# Patient Record
Sex: MALE | Race: BLACK OR AFRICAN AMERICAN | Employment: UNEMPLOYED | URBAN - METROPOLITAN AREA
[De-identification: names, ages, dates, MRNs, and addresses within clinical notes are randomized per-mention and may not be internally consistent; named-entity substitution may affect disease eponyms.]

---

## 2018-05-06 ENCOUNTER — HOSPITAL ENCOUNTER (EMERGENCY)
Age: 43
Discharge: HOME | End: 2018-05-06
Attending: EMERGENCY MEDICINE
Payer: SELF-PAY

## 2018-05-06 VITALS
OXYGEN SATURATION: 98 % | HEIGHT: 67 IN | BODY MASS INDEX: 28.25 KG/M2 | SYSTOLIC BLOOD PRESSURE: 147 MMHG | RESPIRATION RATE: 16 BRPM | DIASTOLIC BLOOD PRESSURE: 98 MMHG | WEIGHT: 180 LBS | HEART RATE: 94 BPM | TEMPERATURE: 98.5 F

## 2018-05-06 DIAGNOSIS — F41.8 ANXIETY ASSOCIATED WITH DEPRESSION: ICD-10-CM

## 2018-05-06 DIAGNOSIS — Z00.00 GENERAL MEDICAL EXAM: Primary | ICD-10-CM

## 2018-05-06 PROCEDURE — 99282 EMERGENCY DEPT VISIT SF MDM: CPT

## 2018-05-06 RX ORDER — VENLAFAXINE HYDROCHLORIDE 150 MG/1
300 CAPSULE, EXTENDED RELEASE ORAL DAILY
COMMUNITY
End: 2018-05-06

## 2018-05-06 RX ORDER — VENLAFAXINE HYDROCHLORIDE 150 MG/1
300 CAPSULE, EXTENDED RELEASE ORAL DAILY
Qty: 60 CAP | Refills: 0 | Status: SHIPPED | OUTPATIENT
Start: 2018-05-06 | End: 2018-06-05

## 2018-05-06 RX ORDER — LORAZEPAM 0.5 MG/1
0.5-1 TABLET ORAL
Qty: 5 TAB | Refills: 0 | Status: SHIPPED | OUTPATIENT
Start: 2018-05-06

## 2018-05-06 NOTE — ED TRIAGE NOTES
\"I was incarcerated. When they released me, they only gave me four tablets of my Effexor. I need a refill. I've been out of it for two days. \"

## 2018-05-06 NOTE — DISCHARGE INSTRUCTIONS

## 2018-05-06 NOTE — ED PROVIDER NOTES
EMERGENCY DEPARTMENT HISTORY AND PHYSICAL EXAM    1:17 PM      Date: 5/6/2018  Patient Name: Kieran Ashraf    History of Presenting Illness     Chief Complaint   Patient presents with    Medication Refill         History Provided By: Patient    Additional History (Context): 1:17 PM Kieran Ashraf is a 41 y.o. male with no pertinent MHx who presents to ED for acute medication refill onset 2 days ago. Patient states that he has not had his medication for 2 days. He was incarcerated for 5 yrs and has been having a hard time getting his mediation. He was told by the medicaid office to report to the ED if necessary. He takes 125mg of Effexor and Ativan. No other concerns or symptoms at this time. As the patient is without physical symptoms or complaints of pain, there is no severity of pain, quality of pain, duration, modifying factors, or associated signs and symptoms regarding the pt's presenting complaint. PCP: None    Chief Complaint: Medication Refill  Duration: 2 Days  Timing:  Acute  Location: N/A  Quality: N/A  Severity: N/A  Modifying Factors: N/A  Associated Symptoms: denies any other associated signs or symptoms      Current Outpatient Prescriptions   Medication Sig Dispense Refill    venlafaxine-SR (EFFEXOR XR) 150 mg capsule Take 300 mg by mouth daily. Past History     Past Medical History:  No past medical history on file. Past Surgical History:  No past surgical history on file. Family History:  No family history on file. Social History:  Social History   Substance Use Topics    Smoking status: Not on file    Smokeless tobacco: Not on file    Alcohol use Not on file       Allergies:  No Known Allergies      Review of Systems     Review of Systems   Constitutional: Negative for chills and fever. Respiratory: Negative for shortness of breath. Cardiovascular: Negative for chest pain. Gastrointestinal: Negative for abdominal pain, diarrhea, nausea and vomiting. Psychiatric/Behavioral: The patient is not nervous/anxious. All other systems reviewed and are negative. Physical Exam     Visit Vitals    BP (!) 147/98 (BP 1 Location: Left arm, BP Patient Position: At rest)    Pulse 94    Temp 98.5 °F (36.9 °C)    Resp 16    Ht 5' 7\" (1.702 m)    Wt 81.6 kg (180 lb)    SpO2 98%    BMI 28.19 kg/m2       Physical Exam   Constitutional: He is oriented to person, place, and time. He appears well-developed. HENT:   Head: Normocephalic and atraumatic. Eyes: EOM are normal. Pupils are equal, round, and reactive to light. Neck: Normal range of motion. Neck supple. Cardiovascular: Normal rate, regular rhythm and normal heart sounds. Exam reveals no friction rub. No murmur heard. Pulmonary/Chest: Effort normal and breath sounds normal. No respiratory distress. He has no wheezes. Abdominal: Soft. He exhibits no distension. There is no tenderness. There is no rebound and no guarding. Musculoskeletal: Normal range of motion. Neurological: He is alert and oriented to person, place, and time. Skin: Skin is warm and dry. Psychiatric: He has a normal mood and affect. His behavior is normal. Thought content normal.         Diagnostic Study Results         Medical Decision Making     1. Med refill; will rx a effoxor and a few ativan. No other complaints. Diagnosis     No diagnosis found. _______________________________    Attestations:  Jeromy Murray acting as a scribe for and in the presence of Jj Houston MD      May 06, 2018 at 1:17 PM       Provider Attestation:      I personally performed the services described in the documentation, reviewed the documentation, as recorded by the scribe in my presence, and it accurately and completely records my words and actions.  May 06, 2018 at 1:17 PM - Jj Houston MD    _______________________________

## 2018-07-15 ENCOUNTER — HOSPITAL ENCOUNTER (EMERGENCY)
Age: 43
Discharge: OTHER INSTITUTION | End: 2018-07-16
Attending: EMERGENCY MEDICINE
Payer: SELF-PAY

## 2018-07-15 DIAGNOSIS — F14.90 COCAINE USE: ICD-10-CM

## 2018-07-15 DIAGNOSIS — R45.851 SUICIDAL IDEATIONS: Primary | ICD-10-CM

## 2018-07-15 DIAGNOSIS — I10 ELEVATED BLOOD PRESSURE READING IN OFFICE WITH DIAGNOSIS OF HYPERTENSION: ICD-10-CM

## 2018-07-15 LAB
AMPHET UR QL SCN: NEGATIVE
ANION GAP SERPL CALC-SCNC: 7 MMOL/L (ref 3–18)
BARBITURATES UR QL SCN: NEGATIVE
BASOPHILS # BLD: 0 K/UL (ref 0–0.1)
BASOPHILS NFR BLD: 1 % (ref 0–2)
BENZODIAZ UR QL: NEGATIVE
BUN SERPL-MCNC: 12 MG/DL (ref 7–18)
BUN/CREAT SERPL: 9 (ref 12–20)
CALCIUM SERPL-MCNC: 9 MG/DL (ref 8.5–10.1)
CANNABINOIDS UR QL SCN: NEGATIVE
CHLORIDE SERPL-SCNC: 103 MMOL/L (ref 100–108)
CO2 SERPL-SCNC: 31 MMOL/L (ref 21–32)
COCAINE UR QL SCN: POSITIVE
CREAT SERPL-MCNC: 1.28 MG/DL (ref 0.6–1.3)
DIFFERENTIAL METHOD BLD: ABNORMAL
EOSINOPHIL # BLD: 0.2 K/UL (ref 0–0.4)
EOSINOPHIL NFR BLD: 5 % (ref 0–5)
ERYTHROCYTE [DISTWIDTH] IN BLOOD BY AUTOMATED COUNT: 12.6 % (ref 11.6–14.5)
ETHANOL SERPL-MCNC: <3 MG/DL (ref 0–3)
GLUCOSE SERPL-MCNC: 132 MG/DL (ref 74–99)
HCT VFR BLD AUTO: 40.5 % (ref 36–48)
HDSCOM,HDSCOM: ABNORMAL
HGB BLD-MCNC: 13.8 G/DL (ref 13–16)
LYMPHOCYTES # BLD: 1.8 K/UL (ref 0.9–3.6)
LYMPHOCYTES NFR BLD: 34 % (ref 21–52)
MCH RBC QN AUTO: 28.9 PG (ref 24–34)
MCHC RBC AUTO-ENTMCNC: 34.1 G/DL (ref 31–37)
MCV RBC AUTO: 84.7 FL (ref 74–97)
METHADONE UR QL: NEGATIVE
MONOCYTES # BLD: 0.6 K/UL (ref 0.05–1.2)
MONOCYTES NFR BLD: 12 % (ref 3–10)
NEUTS SEG # BLD: 2.6 K/UL (ref 1.8–8)
NEUTS SEG NFR BLD: 48 % (ref 40–73)
OPIATES UR QL: POSITIVE
PCP UR QL: NEGATIVE
PLATELET # BLD AUTO: 296 K/UL (ref 135–420)
PMV BLD AUTO: 9.4 FL (ref 9.2–11.8)
POTASSIUM SERPL-SCNC: 3.7 MMOL/L (ref 3.5–5.5)
RBC # BLD AUTO: 4.78 M/UL (ref 4.7–5.5)
SODIUM SERPL-SCNC: 141 MMOL/L (ref 136–145)
WBC # BLD AUTO: 5.3 K/UL (ref 4.6–13.2)

## 2018-07-15 PROCEDURE — 99284 EMERGENCY DEPT VISIT MOD MDM: CPT

## 2018-07-15 PROCEDURE — 80307 DRUG TEST PRSMV CHEM ANLYZR: CPT | Performed by: EMERGENCY MEDICINE

## 2018-07-15 PROCEDURE — 80048 BASIC METABOLIC PNL TOTAL CA: CPT | Performed by: EMERGENCY MEDICINE

## 2018-07-15 PROCEDURE — 85025 COMPLETE CBC W/AUTO DIFF WBC: CPT | Performed by: EMERGENCY MEDICINE

## 2018-07-15 NOTE — BSMART NOTE
Per Manjula Perez clinician, client is accepted for admission to [facility] by Kari Coombs to arrive at 1000 tomorrow morning, 7/16/18. ED staff notified, Kristan Conklin is arranging transportation.

## 2018-07-15 NOTE — ED NOTES
Ainsley Orozco from crisis talked to patient. She is going to get [facility] ([facility]) involved to walk him through the intake process.

## 2018-07-15 NOTE — ED TRIAGE NOTES
Pt states, \"i haven't been doing well. I haven't been home; i've been running the streets since Friday doing drugs (cocaine) and I need help. I don't know what to do. \"

## 2018-07-15 NOTE — BSMART NOTE
Spoke with Matthew Wayne clinician & Lisa Diehl, [facility] Support and they will both come in to see client.

## 2018-07-15 NOTE — BSMART NOTE
Per Ashish De Paz - SRINIVAS clinician:    [facility] - No bed today, can review tomorrow  [facility] sent, will review for tomorrow   [facility] - Will review request today

## 2018-07-15 NOTE — ED PROVIDER NOTES
EMERGENCY DEPARTMENT HISTORY AND PHYSICAL EXAM    Date: 7/15/2018  Patient Name: Selina Rodrigez    History of Presenting Illness     Chief Complaint   Patient presents with    Mental Health Problem         History Provided By: Patient    Chief Complaint: Depression, insomnia, drug abuse   Duration: 5 days   Timing:  Worsening  Location: n/a  Quality: n/a  Severity: Moderate  Modifying Factors: None   Associated Symptoms: none       Additional History (Context): Selina Rodrigez is a 41 y.o. male with no significant medical history who presents with a 5 day history of depression, insomnia, and cocaine abuse. Reports he has not been sleeping for the past five days, has been running the streets, using cocaine, last use yesterday, reports he has not been on his antidepressants for the past 3 months due to loss of insurance and lack of money. Denies SI, HI or hallucinations. Has never been hospitalized for mental health. Pt denies any fevers or chills, headache, dizziness or light headedness, ENT issues, CP or discomfort, SOB, cough, n/v/d/c, abd pain, back pain, diaphoresis, melena/hematochezia, dysuria, hematuria, frequency, focal weakness/numbness/tingling, or rash. Patient has no other complaints at this time. PCP: None    Current Outpatient Prescriptions   Medication Sig Dispense Refill    LORazepam (ATIVAN) 0.5 mg tablet Take 1-2 Tabs by mouth every eight (8) hours as needed for Anxiety. Max Daily Amount: 3 mg. 5 Tab 0       Past History     Past Medical History:  No past medical history on file. Past Surgical History:  No past surgical history on file. Family History:  No family history on file. Social History:  Social History   Substance Use Topics    Smoking status: Not on file    Smokeless tobacco: Not on file    Alcohol use Not on file       Allergies:  No Known Allergies      Review of Systems   Review of Systems   Constitutional: Negative for chills and fever.    HENT: Negative for congestion, rhinorrhea and sore throat. Respiratory: Negative for cough and shortness of breath. Cardiovascular: Negative for chest pain. Gastrointestinal: Negative for abdominal pain, blood in stool, constipation, diarrhea, nausea and vomiting. Genitourinary: Negative for dysuria, frequency and hematuria. Musculoskeletal: Negative for back pain and myalgias. Skin: Negative for rash and wound. Neurological: Negative for dizziness and headaches. Psychiatric/Behavioral:        Depression and drug abuse    All other systems reviewed and are negative. All Other Systems Negative  Physical Exam     Vitals:    07/15/18 1520 07/15/18 2115 07/16/18 0600 07/16/18 0840   BP: 124/82 119/78 115/70 124/74   Pulse: 63 (!) 58 75 88   Resp:  18 18 16   Temp: 99.1 °F (37.3 °C) 97.4 °F (36.3 °C) 98 °F (36.7 °C) 98.4 °F (36.9 °C)   SpO2:  99% 100% 98%   Weight:       Height:         Physical Exam   Constitutional: He is oriented to person, place, and time. He appears well-developed and well-nourished. No distress. HENT:   Head: Normocephalic and atraumatic. Eyes: Conjunctivae are normal.   Neck: Normal range of motion. Neck supple. Cardiovascular: Normal rate, regular rhythm and normal heart sounds. Pulmonary/Chest: Effort normal and breath sounds normal. No respiratory distress. He exhibits no tenderness. Abdominal: Soft. Bowel sounds are normal. He exhibits no distension. There is no tenderness. There is no rebound and no guarding. Musculoskeletal: Normal range of motion. He exhibits no edema or deformity. Neurological: He is alert and oriented to person, place, and time. Skin: Skin is warm and dry. He is not diaphoretic. Psychiatric: His speech is normal and behavior is normal. Judgment and thought content normal. Cognition and memory are normal. He exhibits a depressed mood. He expresses no homicidal and no suicidal ideation. He expresses no suicidal plans and no homicidal plans.    Nursing note and vitals reviewed. Diagnostic Study Results     Labs -     No results found for this or any previous visit (from the past 12 hour(s)). Radiologic Studies -   No orders to display     CT Results  (Last 48 hours)    None        CXR Results  (Last 48 hours)    None            Medical Decision Making   I am the first provider for this patient. I reviewed the vital signs, available nursing notes, past medical history, past surgical history, family history and social history. Vital Signs-Reviewed the patient's vital signs. Pulse Oximetry Analysis -  100 % RA    Records Reviewed: Nursing Notes and Old Medical Records     Procedures: None   Procedures    Provider Notes (Medical Decision Making):     Differential Diagnosis: substance abuse, alcohol intoxication, substance withdrawal, acute psychotic episode, anxiety, panic disorder, bubba, undifferentiated schizophrenia, depression, SI, HI, medication non-compliance, other mood disorder    Plan: will medically clear and call crisis    7:41 AM  Have discussed case with Radha Clayton with Crisis who agrees to come to ED to evaluate patient    8:53 AM  Radha Clayton does not feel patient is appropriate for [facility] eval or admission is going to contact Peer group for further eval     12:12 PM  Peer group recommends Crisis stabilization, Anna Long will now look for placement       12:55 PM : Pt care transferred to Scott Roblero NP  ,ED provider. History of patient complaint(s), available diagnostic reports and current treatment plan has been discussed thoroughly. Bedside rounding on patient occured : no . Intended disposition of patient : Placement for crisis stabilization   Pending diagnostics reports and/or labs (please list): Pending placement and acceptance to facility  1:48 PM - Mr. Stephen Kaye is resting comfortably and wakes easily, has finished 50% of his lunch and beverages and says that he is tired and appreciates the bed and quiet.   He is currently pending further disposition by Sirena QUEEN and documentation is on his paper chart. No current suicidal or homicidal ideations. Mundo Álvarez DNP, FNP-C        MED RECONCILIATION:  No current facility-administered medications for this encounter. Current Outpatient Prescriptions   Medication Sig    LORazepam (ATIVAN) 0.5 mg tablet Take 1-2 Tabs by mouth every eight (8) hours as needed for Anxiety. Max Daily Amount: 3 mg. Disposition:  Transfer     Diagnosis     Clinical Impression:   1. Suicidal ideations    2. Elevated blood pressure reading in office with diagnosis of hypertension    3.  Cocaine use

## 2018-07-15 NOTE — BSMART NOTE
40 yo M seen in ED room 21 at the request of NORMA Spivey. Drowsy, wakes easily to name call to alert & O x 4, polite and cooperative. Appears & reports depression. Memory and judgement intact, insight poor. Lives with mother since April 27,2018 release from incarceration. On parole/probation with next appointment Tuesday, 7/17/18. Works as a , reports only having missed one day at work. States family may now be concerned since he has not contacted them in a few days. Olya Baum will know now what happened. \"    CC: \" I took a dive off the deep end. Struggled all my life with addiction and depression. \"    States he hasn't been home, running the streets, even knowing that he's messing up. States he can't stop, that he has tried everything. \"It's always take a number, wait list. Alma Martinez to a private Suboxone clinic and I just couldn't keep up with the expense. \" Attended an in-group home substance abuse program. Treated the 5 years incarcerated with Effexor and was released with 4 day supply. Got one refill in [address] on 5/6/18. Been off medication 6-8 weeks. Feels hopeless & depressed, denies that he would act in suicidal or homicidal manner. Denies A / V hallucinations, not psychotic. Denies alcohol. Only been \"dabbling\" with the drugs since group home release. Believes it's got him bad again. Last used heroin by inhalation few days ago. IV use in past. Last smoked crack yesterday. BAL=negative, UDSC + opiate & cocaine. Has attended some NA 12-step meetings in past.    DISPOSITION: Discussed with NORMA Spivey. Will contact [facility] clinician & [facility]- to assess for possible community resources.

## 2018-07-16 VITALS
BODY MASS INDEX: 25.11 KG/M2 | HEART RATE: 88 BPM | TEMPERATURE: 98.4 F | WEIGHT: 160 LBS | SYSTOLIC BLOOD PRESSURE: 124 MMHG | HEIGHT: 67 IN | RESPIRATION RATE: 16 BRPM | DIASTOLIC BLOOD PRESSURE: 74 MMHG | OXYGEN SATURATION: 98 %

## 2018-07-16 NOTE — ED NOTES
Received patient in room 21. Patient appears in no distress. Patient is currently cooperative and denies SI/HI. Reviewed poc with patient. Questions encouraged and answered.   Patient verbalized an understanding

## 2018-07-16 NOTE — ED NOTES
Pt will be going to [facility] tomorrow, 7/16/18 at 1000. Dr. Zulma Powell is accepting physician per [facility].    Phone number for report: [phone]

## 2018-07-16 NOTE — ED NOTES
AVEL signed off and given to General acute hospital staff. Pt in no acute distress at this time. Pt aware he is going to [facility]. Pt belongings with him.

## 2018-07-16 NOTE — ED NOTES
Bedside shift change report given to Maricarmen Gallardo RN (oncoming nurse) by Elías Caballero RN (offgoing nurse). Report included the following information SBAR, ED Summary, Intake/Output, MAR and Recent Results.

## 2024-09-05 ENCOUNTER — APPOINTMENT (OUTPATIENT)
Facility: HOSPITAL | Age: 49
End: 2024-09-05
Payer: COMMERCIAL

## 2024-09-05 ENCOUNTER — HOSPITAL ENCOUNTER (EMERGENCY)
Facility: HOSPITAL | Age: 49
Discharge: HOME | End: 2024-09-05
Attending: EMERGENCY MEDICINE
Payer: COMMERCIAL

## 2024-09-05 VITALS
HEART RATE: 67 BPM | HEIGHT: 67 IN | TEMPERATURE: 98.2 F | SYSTOLIC BLOOD PRESSURE: 145 MMHG | RESPIRATION RATE: 16 BRPM | DIASTOLIC BLOOD PRESSURE: 85 MMHG | OXYGEN SATURATION: 99 % | WEIGHT: 165 LBS | BODY MASS INDEX: 25.9 KG/M2

## 2024-09-05 DIAGNOSIS — L72.9 SKIN CYST: ICD-10-CM

## 2024-09-05 DIAGNOSIS — L03.113 CELLULITIS OF RIGHT UPPER EXTREMITY: Primary | ICD-10-CM

## 2024-09-05 PROCEDURE — 73130 X-RAY EXAM OF HAND: CPT

## 2024-09-05 PROCEDURE — 99283 EMERGENCY DEPT VISIT LOW MDM: CPT

## 2024-09-05 RX ORDER — CLINDAMYCIN HCL 300 MG
300 CAPSULE ORAL 3 TIMES DAILY
Qty: 21 CAPSULE | Refills: 0 | Status: SHIPPED | OUTPATIENT
Start: 2024-09-05 | End: 2024-09-12

## 2024-09-05 RX ORDER — CETIRIZINE HYDROCHLORIDE 10 MG/1
10 TABLET ORAL DAILY
Qty: 7 TABLET | Refills: 0 | Status: SHIPPED | OUTPATIENT
Start: 2024-09-05 | End: 2024-09-12

## 2024-09-05 ASSESSMENT — LIFESTYLE VARIABLES
HOW MANY STANDARD DRINKS CONTAINING ALCOHOL DO YOU HAVE ON A TYPICAL DAY: 1 OR 2
HOW OFTEN DO YOU HAVE A DRINK CONTAINING ALCOHOL: 2-4 TIMES A MONTH

## 2024-09-05 ASSESSMENT — PAIN DESCRIPTION - ORIENTATION: ORIENTATION: RIGHT

## 2024-09-05 ASSESSMENT — ENCOUNTER SYMPTOMS
EYES NEGATIVE: 1
CHEST TIGHTNESS: 0
ABDOMINAL PAIN: 0
COLOR CHANGE: 1

## 2024-09-05 ASSESSMENT — PAIN DESCRIPTION - LOCATION: LOCATION: HAND

## 2024-09-05 ASSESSMENT — PAIN SCALES - GENERAL: PAINLEVEL_OUTOF10: 6

## 2024-09-05 ASSESSMENT — PAIN - FUNCTIONAL ASSESSMENT: PAIN_FUNCTIONAL_ASSESSMENT: 0-10

## 2024-09-05 ASSESSMENT — PAIN DESCRIPTION - DESCRIPTORS: DESCRIPTORS: PRESSURE;TINGLING

## 2024-09-05 NOTE — ED PROVIDER NOTES
EMERGENCY DEPARTMENT HISTORY AND PHYSICAL EXAM    10:17 AM      Date: 9/5/2024  Patient Name: Deepak Goldstein    History of Presenting Illness     Chief Complaint   Patient presents with    Hand Pain       History From: Patient    Deepak Goldstein is a 49 y.o. male   Patient is a 49-year-old male without significant medical history who presents emergency department with right arm pain and swelling and itching.  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  Patient works as a  is working for 20 years and says he gets metal on him not on frequently and was not sure if there was some metal stuck in his arm.  Patient denies any other aggravating or alleviating factors.  Patient has not had any medications for his symptoms.  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           Nursing Notes were all reviewed and agreed with or any disagreements were addressed in the HPI.    PCP: No primary care provider on file.    No current facility-administered medications for this encounter.     No current outpatient medications on file.       Past History     Past Medical History:  Past Medical History:   Diagnosis Date    Hyperlipidemia        Past Surgical History:  History reviewed. No pertinent surgical history.    Family History:  History reviewed. No pertinent family history.    Social History:       Allergies:  No Known Allergies      Review of Systems       Review of Systems   Constitutional:  Negative for activity change and fatigue.   HENT:  Negative for congestion.    Eyes: Negative.    Respiratory:  Negative for chest tightness.    Cardiovascular:  Negative for chest pain.   Gastrointestinal:  Negative for abdominal pain.   Genitourinary:

## 2024-09-05 NOTE — ED TRIAGE NOTES
Pt to ED for eval of right hand pain/swelling that started yesterday. Pt has blister near medial aspect of right wrist. Pt reports he is a  and is concerned about foreign body.

## 2024-09-05 NOTE — DISCHARGE INSTRUCTIONS
Your x-ray showed no evidence of metal in your hand, we will start you on an antibiotic for the next week we will also give you an anti-itch medicine and that combination should help get this under control.  Give any worsening, please return immediately for reevaluation.  Consider speaking to your employer regarding the symptoms today as it may be related to work related exposures.

## 2024-09-12 ENCOUNTER — HOSPITAL ENCOUNTER (EMERGENCY)
Facility: HOSPITAL | Age: 49
Discharge: LEFT BEFORE DISCHARGE | End: 2024-09-12
Payer: COMMERCIAL

## 2024-09-12 VITALS
HEART RATE: 87 BPM | OXYGEN SATURATION: 100 % | SYSTOLIC BLOOD PRESSURE: 160 MMHG | RESPIRATION RATE: 18 BRPM | BODY MASS INDEX: 25.9 KG/M2 | WEIGHT: 165 LBS | DIASTOLIC BLOOD PRESSURE: 89 MMHG | HEIGHT: 67 IN | TEMPERATURE: 97.5 F

## 2024-09-12 LAB
FLUAV RNA SPEC QL NAA+PROBE: NOT DETECTED
FLUBV RNA SPEC QL NAA+PROBE: NOT DETECTED
SARS-COV-2 RNA RESP QL NAA+PROBE: NOT DETECTED
SOURCE: NORMAL

## 2024-09-12 PROCEDURE — 87636 SARSCOV2 & INF A&B AMP PRB: CPT

## 2024-09-12 ASSESSMENT — LIFESTYLE VARIABLES
HOW OFTEN DO YOU HAVE A DRINK CONTAINING ALCOHOL: NEVER
HOW MANY STANDARD DRINKS CONTAINING ALCOHOL DO YOU HAVE ON A TYPICAL DAY: PATIENT DOES NOT DRINK

## 2024-09-12 ASSESSMENT — PAIN - FUNCTIONAL ASSESSMENT: PAIN_FUNCTIONAL_ASSESSMENT: NONE - DENIES PAIN
